# Patient Record
Sex: MALE | Race: WHITE | NOT HISPANIC OR LATINO | Employment: FULL TIME | URBAN - METROPOLITAN AREA
[De-identification: names, ages, dates, MRNs, and addresses within clinical notes are randomized per-mention and may not be internally consistent; named-entity substitution may affect disease eponyms.]

---

## 2023-04-19 DIAGNOSIS — J30.1 SEASONAL ALLERGIC RHINITIS DUE TO POLLEN: Primary | ICD-10-CM

## 2023-04-20 PROBLEM — S43.431A LABRAL TEAR OF SHOULDER, RIGHT, INITIAL ENCOUNTER: Status: ACTIVE | Noted: 2023-04-20

## 2023-04-20 PROBLEM — I10 HYPERTENSION, BENIGN: Status: ACTIVE | Noted: 2023-04-20

## 2023-04-20 PROBLEM — M79.671 PAIN OF RIGHT HEEL: Status: ACTIVE | Noted: 2023-04-20

## 2023-04-20 PROBLEM — N52.9 ERECTILE DYSFUNCTION: Status: ACTIVE | Noted: 2023-04-20

## 2023-04-20 PROBLEM — M1A.0720 CHRONIC GOUT OF LEFT ANKLE: Status: ACTIVE | Noted: 2023-04-20

## 2023-04-20 PROBLEM — R12 PYROSIS: Status: ACTIVE | Noted: 2023-04-20

## 2023-04-20 PROBLEM — M10.9 GOUT: Status: ACTIVE | Noted: 2023-04-20

## 2023-04-20 PROBLEM — M54.9 BACK PAIN: Status: ACTIVE | Noted: 2023-04-20

## 2023-04-20 PROBLEM — M77.31 CALCANEAL SPUR OF RIGHT FOOT: Status: ACTIVE | Noted: 2023-04-20

## 2023-04-20 PROBLEM — S84.91XA: Status: ACTIVE | Noted: 2023-04-20

## 2023-04-20 PROBLEM — J30.2 SEASONAL RHINITIS: Status: ACTIVE | Noted: 2023-04-20

## 2023-04-20 PROBLEM — J45.909 ASTHMA (HHS-HCC): Status: ACTIVE | Noted: 2023-04-20

## 2023-04-20 PROBLEM — E66.9 OBESITY, CLASS II, BMI 35-39.9: Status: ACTIVE | Noted: 2023-04-20

## 2023-04-20 PROBLEM — M75.41 SUBACROMIAL IMPINGEMENT OF RIGHT SHOULDER: Status: ACTIVE | Noted: 2023-04-20

## 2023-04-20 PROBLEM — L30.9 ECZEMA: Status: ACTIVE | Noted: 2023-04-20

## 2023-04-20 PROBLEM — M75.121 COMPLETE TEAR OF RIGHT ROTATOR CUFF: Status: ACTIVE | Noted: 2023-04-20

## 2023-04-20 RX ORDER — BECLOMETHASONE DIPROPIONATE HFA 80 UG/1
2 AEROSOL, METERED RESPIRATORY (INHALATION) DAILY
COMMUNITY
Start: 2020-12-10 | End: 2023-06-29 | Stop reason: ALTCHOICE

## 2023-04-20 RX ORDER — ALBUTEROL SULFATE 90 UG/1
2 AEROSOL, METERED RESPIRATORY (INHALATION) EVERY 6 HOURS PRN
COMMUNITY

## 2023-04-20 RX ORDER — HYDROCODONE BITARTRATE AND ACETAMINOPHEN 7.5; 325 MG/1; MG/1
1 TABLET ORAL EVERY 6 HOURS PRN
COMMUNITY
Start: 2023-03-11 | End: 2023-06-29 | Stop reason: ALTCHOICE

## 2023-04-20 RX ORDER — SILDENAFIL 100 MG/1
TABLET, FILM COATED ORAL
COMMUNITY
Start: 2019-06-03

## 2023-04-20 RX ORDER — METHYLPREDNISOLONE 4 MG/1
TABLET ORAL
COMMUNITY
Start: 2022-04-18 | End: 2023-06-29 | Stop reason: ALTCHOICE

## 2023-04-20 RX ORDER — LOSARTAN POTASSIUM AND HYDROCHLOROTHIAZIDE 25; 100 MG/1; MG/1
1 TABLET ORAL DAILY
COMMUNITY
Start: 2019-05-30

## 2023-04-20 RX ORDER — BECLOMETHASONE DIPROPIONATE HFA 80 UG/1
AEROSOL, METERED RESPIRATORY (INHALATION) 2 TIMES DAILY PRN
COMMUNITY

## 2023-04-20 RX ORDER — BETAMETHASONE DIPROPIONATE 0.5 MG/G
CREAM TOPICAL 2 TIMES DAILY
COMMUNITY
Start: 2020-12-16

## 2023-04-20 RX ORDER — AMOXICILLIN 875 MG/1
1 TABLET, FILM COATED ORAL 2 TIMES DAILY
COMMUNITY
Start: 2023-03-11 | End: 2023-06-29 | Stop reason: ALTCHOICE

## 2023-04-20 RX ORDER — COLCHICINE 0.6 MG/1
TABLET ORAL
COMMUNITY
Start: 2022-04-16 | End: 2023-06-29 | Stop reason: ALTCHOICE

## 2023-04-20 RX ORDER — IBUPROFEN 800 MG/1
TABLET ORAL
COMMUNITY
Start: 2023-03-11 | End: 2023-06-29 | Stop reason: ALTCHOICE

## 2023-04-20 RX ORDER — FLUTICASONE PROPIONATE 50 MCG
SPRAY, SUSPENSION (ML) NASAL
Qty: 48 G | Refills: 3 | Status: SHIPPED | OUTPATIENT
Start: 2023-04-20

## 2023-04-20 RX ORDER — FLUTICASONE PROPIONATE 110 UG/1
AEROSOL, METERED RESPIRATORY (INHALATION) 2 TIMES DAILY PRN
COMMUNITY

## 2023-04-20 RX ORDER — FLUTICASONE PROPIONATE 50 MCG
SPRAY, SUSPENSION (ML) NASAL
COMMUNITY
Start: 2020-12-10 | End: 2023-06-29 | Stop reason: ALTCHOICE

## 2023-06-15 ENCOUNTER — OFFICE VISIT (OUTPATIENT)
Dept: PRIMARY CARE | Facility: CLINIC | Age: 53
End: 2023-06-15
Payer: COMMERCIAL

## 2023-06-15 VITALS
TEMPERATURE: 98.1 F | BODY MASS INDEX: 38.54 KG/M2 | OXYGEN SATURATION: 97 % | DIASTOLIC BLOOD PRESSURE: 80 MMHG | SYSTOLIC BLOOD PRESSURE: 160 MMHG | HEART RATE: 69 BPM | WEIGHT: 257.2 LBS

## 2023-06-15 DIAGNOSIS — I10 HYPERTENSION, BENIGN: Primary | ICD-10-CM

## 2023-06-15 DIAGNOSIS — R06.2 WHEEZE: ICD-10-CM

## 2023-06-15 DIAGNOSIS — R09.89 BILATERAL RALES: ICD-10-CM

## 2023-06-15 PROBLEM — L30.9 ECZEMA: Status: RESOLVED | Noted: 2023-04-20 | Resolved: 2023-06-15

## 2023-06-15 PROBLEM — M79.671 PAIN OF RIGHT HEEL: Status: RESOLVED | Noted: 2023-04-20 | Resolved: 2023-06-15

## 2023-06-15 PROCEDURE — 3079F DIAST BP 80-89 MM HG: CPT | Performed by: NURSE PRACTITIONER

## 2023-06-15 PROCEDURE — 99212 OFFICE O/P EST SF 10 MIN: CPT | Performed by: NURSE PRACTITIONER

## 2023-06-15 PROCEDURE — 3077F SYST BP >= 140 MM HG: CPT | Performed by: NURSE PRACTITIONER

## 2023-06-15 ASSESSMENT — PAIN SCALES - GENERAL: PAINLEVEL: 0-NO PAIN

## 2023-06-15 NOTE — PROGRESS NOTES
Subjective   Patient ID: Rush Green is a 53 y.o. male who presents for Follow-up.    HPI     Review of Systems    Objective   /80 (BP Location: Left arm, Patient Position: Sitting, BP Cuff Size: Large adult)   Pulse 69   Temp 36.7 °C (98.1 °F) (Temporal)   Wt 117 kg (257 lb 3.2 oz)   SpO2 97%   BMI 38.54 kg/m²     Physical Exam    Assessment/Plan

## 2023-06-15 NOTE — PROGRESS NOTES
Subjective   Patient ID: Rush Green is a 53 y.o. male who presents for Follow-up.    HPI   Presents for follow up on hypertension. Watching sodium, current medications include Losartan-hydrochlorothiazide combo. Has not been evaluated by Cardiology    New cough, reports started 1 week ago, PND, runny nose, sore throat. Productive cough, clear. On exam rales throughout, poor exchange    Review of Systems  Review of Systems   Constitutional: Negative.    HENT: HPI  Respiratory: Negative.     Cardiovascular: Negative.    Gastrointestinal: Negative.    All other systems reviewed and are negative.    Objective   /80 (BP Location: Left arm, Patient Position: Sitting, BP Cuff Size: Large adult)   Pulse 69   Temp 36.7 °C (98.1 °F) (Temporal)   Wt 117 kg (257 lb 3.2 oz)   SpO2 97%   BMI 38.54 kg/m²     Physical Exam  Constitutional:       Appearance: Normal appearance.   HENT:      Right Ear: Tympanic membrane and ear canal normal.      Left Ear: Tympanic membrane and ear canal normal.      Nose: Congestion and rhinorrhea present.      Mouth/Throat:      Pharynx: Oropharynx is clear.   Cardiovascular:      Rate and Rhythm: Normal rate and regular rhythm.   Pulmonary:      Effort: Pulmonary effort is normal.      Breath sounds: Decreased air movement present. Rales present.   Abdominal:      General: Bowel sounds are normal.   Neurological:      Mental Status: He is alert.           Assessment/Plan   Problem List Items Addressed This Visit       Hypertension, benign - Primary    Relevant Orders    Referral to Cardiology    Bilateral rales     Albuterol inhaler q4 prn         Relevant Orders    XR chest 2 views     Other Visit Diagnoses       Wheeze

## 2023-06-29 ENCOUNTER — OFFICE VISIT (OUTPATIENT)
Dept: PRIMARY CARE | Facility: CLINIC | Age: 53
End: 2023-06-29
Payer: COMMERCIAL

## 2023-06-29 VITALS
BODY MASS INDEX: 38.51 KG/M2 | SYSTOLIC BLOOD PRESSURE: 130 MMHG | DIASTOLIC BLOOD PRESSURE: 70 MMHG | TEMPERATURE: 97.8 F | WEIGHT: 257 LBS | HEART RATE: 92 BPM | OXYGEN SATURATION: 98 %

## 2023-06-29 DIAGNOSIS — R05.1 ACUTE COUGH: ICD-10-CM

## 2023-06-29 DIAGNOSIS — R09.89 BILATERAL RALES: Primary | ICD-10-CM

## 2023-06-29 PROCEDURE — 99212 OFFICE O/P EST SF 10 MIN: CPT | Performed by: NURSE PRACTITIONER

## 2023-06-29 PROCEDURE — 3078F DIAST BP <80 MM HG: CPT | Performed by: NURSE PRACTITIONER

## 2023-06-29 PROCEDURE — 3075F SYST BP GE 130 - 139MM HG: CPT | Performed by: NURSE PRACTITIONER

## 2023-06-29 RX ORDER — PREDNISONE 20 MG/1
TABLET ORAL
COMMUNITY
Start: 2023-06-18 | End: 2023-06-29 | Stop reason: ALTCHOICE

## 2023-06-29 RX ORDER — DOXYCYCLINE 100 MG/1
100 TABLET ORAL EVERY 12 HOURS
Qty: 20 TABLET | Refills: 0 | COMMUNITY
Start: 2023-06-18 | End: 2023-06-28

## 2023-06-29 ASSESSMENT — ENCOUNTER SYMPTOMS
COUGH: 1
CONSTITUTIONAL NEGATIVE: 1
GASTROINTESTINAL NEGATIVE: 1
CARDIOVASCULAR NEGATIVE: 1

## 2023-06-29 ASSESSMENT — PAIN SCALES - GENERAL: PAINLEVEL: 0-NO PAIN

## 2023-06-29 NOTE — PROGRESS NOTES
Subjective   Patient ID: Rush Green is a 53 y.o. male who presents for Follow-up.    HPI   Follow up on cough. Treated with albuterol inhaler for persistent cough  Obtained Chest  xray no acute process  Had been feeling ok for a day or two, Reports worsening cough and congestion after a few days and his daughter who is an RN sent him to [place] ED. Repeat Xray no changes. Received nebulizer treatment with good results. Sent home with Doxycycline and Medrol dose pack. Today feeling better, cough mostly resolved, still with scant rhonchi.  Recommend he continue inhaler bid for 3 more days       Review of Systems   Constitutional: Negative.    HENT: Negative.     Respiratory:  Positive for cough.    Cardiovascular: Negative.    Gastrointestinal: Negative.    Genitourinary: Negative.    All other systems reviewed and are negative.        Objective   /70 (BP Location: Left arm, Patient Position: Sitting, BP Cuff Size: Large adult)   Pulse 92   Temp 36.6 °C (97.8 °F) (Temporal)   Wt 117 kg (257 lb)   SpO2 98%   BMI 38.51 kg/m²     Physical Exam  Vitals reviewed.   Constitutional:       Appearance: Normal appearance.   Cardiovascular:      Rate and Rhythm: Normal rate and regular rhythm.   Pulmonary:      Effort: Pulmonary effort is normal. No respiratory distress.      Breath sounds: Rhonchi present. No wheezing or rales.   Abdominal:      General: Bowel sounds are normal.   Musculoskeletal:         General: Normal range of motion.   Neurological:      General: No focal deficit present.      Mental Status: He is alert.   Psychiatric:         Mood and Affect: Mood normal.           Assessment/Plan   Problem List Items Addressed This Visit       Bilateral rales - Primary    Acute cough     Completed antibiotic, Steroid with good results  Continue albuterol bid for 3 days

## 2023-06-29 NOTE — PROGRESS NOTES
Subjective   Patient ID: Rush Green is a 53 y.o. male who presents for Follow-up.    HPI     Review of Systems    Objective   /70 (BP Location: Left arm, Patient Position: Sitting, BP Cuff Size: Large adult)   Pulse 92   Temp 36.6 °C (97.8 °F) (Temporal)   Wt 117 kg (257 lb)   SpO2 98%   BMI 38.51 kg/m²     Physical Exam    Assessment/Plan

## 2024-06-24 DIAGNOSIS — I10 HYPERTENSION, BENIGN: ICD-10-CM

## 2024-06-26 RX ORDER — AMLODIPINE BESYLATE 5 MG/1
5 TABLET ORAL DAILY
Qty: 90 TABLET | Refills: 3 | Status: SHIPPED | OUTPATIENT
Start: 2024-06-26